# Patient Record
Sex: MALE | Race: BLACK OR AFRICAN AMERICAN | NOT HISPANIC OR LATINO | URBAN - NONMETROPOLITAN AREA
[De-identification: names, ages, dates, MRNs, and addresses within clinical notes are randomized per-mention and may not be internally consistent; named-entity substitution may affect disease eponyms.]

---

## 2020-01-13 ENCOUNTER — IMPORTED ENCOUNTER (OUTPATIENT)
Dept: URBAN - NONMETROPOLITAN AREA CLINIC 1 | Facility: CLINIC | Age: 80
End: 2020-01-13

## 2020-01-13 PROBLEM — H52.03: Noted: 2020-01-13

## 2020-01-13 PROBLEM — H52.223: Noted: 2020-01-13

## 2020-01-13 PROBLEM — H25.813: Noted: 2020-01-13

## 2020-01-13 PROBLEM — H52.4: Noted: 2020-01-13

## 2020-01-13 PROBLEM — H35.033: Noted: 2020-01-13

## 2020-01-13 PROCEDURE — 92015 DETERMINE REFRACTIVE STATE: CPT

## 2020-01-13 PROCEDURE — 99204 OFFICE O/P NEW MOD 45 MIN: CPT

## 2020-07-16 ENCOUNTER — IMPORTED ENCOUNTER (OUTPATIENT)
Dept: URBAN - NONMETROPOLITAN AREA CLINIC 1 | Facility: CLINIC | Age: 80
End: 2020-07-16

## 2020-07-16 PROCEDURE — 99213 OFFICE O/P EST LOW 20 MIN: CPT

## 2020-07-16 NOTE — PATIENT DISCUSSION
Cataract(s)-Visually significant.-Cataract(s) causing symptomatic impairment of visual function not correctable with a tolerable change in glasses or contact lenses lighting or non-operative means resulting in specific activity limitations and/or participation restrictions including but not limited to reading viewing television driving or meeting vocational or recreational needs. -Expectation is clearer vision and reduced glare disability after cataract removal.-Refer to Dr Farideh Walters for cataract evaluationHTN Retinopathy OU- Discussed the importance of blood pressure control in the prevention of ocular complications. - Discussed possible association with systemic conditions including hypertension and diabetes. - Stressed the importance of controlling vascular risk factors. - Recommended observation.

## 2022-04-09 ASSESSMENT — VISUAL ACUITY
OS_SC: 20/30-2
OD_CC: J2
OD_SC: 20/25+2
OS_CC: J2
OD_SC: 20/30-2
OS_SC: 20/40+2

## 2022-04-09 ASSESSMENT — TONOMETRY
OS_IOP_MMHG: 14
OD_IOP_MMHG: 14
OD_IOP_MMHG: 17
OS_IOP_MMHG: 18